# Patient Record
Sex: MALE | Race: ASIAN | NOT HISPANIC OR LATINO
[De-identification: names, ages, dates, MRNs, and addresses within clinical notes are randomized per-mention and may not be internally consistent; named-entity substitution may affect disease eponyms.]

---

## 2017-03-09 PROBLEM — Z00.00 ENCOUNTER FOR PREVENTIVE HEALTH EXAMINATION: Status: ACTIVE | Noted: 2017-03-09

## 2017-03-31 ENCOUNTER — APPOINTMENT (OUTPATIENT)
Dept: HUMAN REPRODUCTION | Facility: CLINIC | Age: 47
End: 2017-03-31

## 2017-06-13 ENCOUNTER — APPOINTMENT (OUTPATIENT)
Dept: HUMAN REPRODUCTION | Facility: CLINIC | Age: 47
End: 2017-06-13

## 2017-11-16 ENCOUNTER — APPOINTMENT (OUTPATIENT)
Dept: HUMAN REPRODUCTION | Facility: CLINIC | Age: 47
End: 2017-11-16
Payer: COMMERCIAL

## 2017-11-16 PROCEDURE — 89259 CRYOPRESERVATION SPERM: CPT

## 2017-11-16 PROCEDURE — 89261 SPERM ISOLATION COMPLEX: CPT

## 2017-11-16 PROCEDURE — 89322 SEMEN ANAL STRICT CRITERIA: CPT | Mod: 59

## 2017-11-16 PROCEDURE — 89343 STORAGE/YEAR SPERM/SEMEN: CPT

## 2017-11-16 PROCEDURE — 89325 SPERM ANTIBODY TEST: CPT

## 2017-12-05 PROCEDURE — 89261 SPERM ISOLATION COMPLEX: CPT

## 2017-12-05 PROCEDURE — 89322 SEMEN ANAL STRICT CRITERIA: CPT | Mod: 59

## 2021-08-23 ENCOUNTER — APPOINTMENT (OUTPATIENT)
Dept: HUMAN REPRODUCTION | Facility: CLINIC | Age: 51
End: 2021-08-23

## 2021-12-17 ENCOUNTER — APPOINTMENT (OUTPATIENT)
Dept: HUMAN REPRODUCTION | Facility: CLINIC | Age: 51
End: 2021-12-17

## 2022-02-11 ENCOUNTER — APPOINTMENT (OUTPATIENT)
Dept: HUMAN REPRODUCTION | Facility: CLINIC | Age: 52
End: 2022-02-11

## 2022-03-29 ENCOUNTER — APPOINTMENT (OUTPATIENT)
Dept: GASTROENTEROLOGY | Facility: CLINIC | Age: 52
End: 2022-03-29
Payer: COMMERCIAL

## 2022-03-29 VITALS
BODY MASS INDEX: 30.13 KG/M2 | HEIGHT: 67 IN | WEIGHT: 192 LBS | SYSTOLIC BLOOD PRESSURE: 115 MMHG | DIASTOLIC BLOOD PRESSURE: 80 MMHG

## 2022-03-29 DIAGNOSIS — I45.10 UNSPECIFIED RIGHT BUNDLE-BRANCH BLOCK: ICD-10-CM

## 2022-03-29 DIAGNOSIS — Z83.71 FAMILY HISTORY OF COLONIC POLYPS: ICD-10-CM

## 2022-03-29 DIAGNOSIS — Z86.69 PERSONAL HISTORY OF OTHER DISEASES OF THE NERVOUS SYSTEM AND SENSE ORGANS: ICD-10-CM

## 2022-03-29 DIAGNOSIS — Z83.71 ENCOUNTER FOR SCREENING FOR MALIGNANT NEOPLASM OF COLON: ICD-10-CM

## 2022-03-29 DIAGNOSIS — J98.4 OTHER DISORDERS OF LUNG: ICD-10-CM

## 2022-03-29 DIAGNOSIS — R73.03 PREDIABETES.: ICD-10-CM

## 2022-03-29 DIAGNOSIS — Z78.9 OTHER SPECIFIED HEALTH STATUS: ICD-10-CM

## 2022-03-29 DIAGNOSIS — Z12.11 ENCOUNTER FOR SCREENING FOR MALIGNANT NEOPLASM OF COLON: ICD-10-CM

## 2022-03-29 DIAGNOSIS — K76.89 OTHER SPECIFIED DISEASES OF LIVER: ICD-10-CM

## 2022-03-29 DIAGNOSIS — Z87.09 PERSONAL HISTORY OF OTHER DISEASES OF THE RESPIRATORY SYSTEM: ICD-10-CM

## 2022-03-29 PROCEDURE — 99204 OFFICE O/P NEW MOD 45 MIN: CPT

## 2022-03-29 RX ORDER — SODIUM SULFATE, POTASSIUM SULFATE, MAGNESIUM SULFATE 17.5; 3.13; 1.6 G/ML; G/ML; G/ML
17.5-3.13-1.6 SOLUTION, CONCENTRATE ORAL TWICE DAILY
Qty: 2 | Refills: 0 | Status: ACTIVE | COMMUNITY
Start: 2022-03-29 | End: 1900-01-01

## 2022-03-29 NOTE — HISTORY OF PRESENT ILLNESS
[FreeTextEntry1] : He is an asymptomatic 51-year-old male referred for a screening colonoscopy.  He has a family history of colon polyps specifically his father.  His last colonoscopy was 5 years ago.  He also recently was found to have a  right bundle branch block so had a  CT of his coronary arteries which revealed a questionable liver cyst.  He denies abdominal pain, weight loss or jaundice.

## 2022-03-29 NOTE — CONSULT LETTER
[Dear  ___] : Dear  [unfilled], [Consult Letter:] : I had the pleasure of evaluating your patient, [unfilled]. [( Thank you for referring [unfilled] for consultation for _____ )] : Thank you for referring [unfilled] for consultation for [unfilled] [Please see my note below.] : Please see my note below. [Consult Closing:] : Thank you very much for allowing me to participate in the care of this patient.  If you have any questions, please do not hesitate to contact me. [Sincerely,] : Sincerely, [FreeTextEntry3] : Nathaniel Kiran MD\par \par Gastroenterology\par [facility]\par [facility]\par \par

## 2022-03-29 NOTE — ASSESSMENT
[FreeTextEntry1] : FIDENCIO CLARK was advised to undergo colonoscopy to which he agreed. The procedure will be performed in [facility] \par [facility] with the assistance of an anesthesiologist. The patient was given a Suprep preparation prescription and understood the \par procedure as it was explained to his. He was given a booklet distributed by the American Society of Gastrointestinal\par  Endoscopy explaining the procedure in detail and he understood the risks of the procedure not limited to infection, bleeding,\par perforation or non- diagnosis of colorectal cancer. He was advised that he could not drive home, if he chooses to\par  receive sedation.\par \par Further diagnostic and treatment recommendations will be based upon the procedure and any biopsies, if they are taken.\par \par Thank you for allowing me to participate in this [facility] health care.\par \par , Best personal regards -- Don\par \par \par CT of abdomen with contrast ordered

## 2022-04-09 ENCOUNTER — APPOINTMENT (OUTPATIENT)
Dept: HUMAN REPRODUCTION | Facility: CLINIC | Age: 52
End: 2022-04-09

## 2022-05-10 ENCOUNTER — APPOINTMENT (OUTPATIENT)
Dept: GASTROENTEROLOGY | Facility: AMBULATORY SURGERY CENTER | Age: 52
End: 2022-05-10

## 2022-12-15 ENCOUNTER — APPOINTMENT (OUTPATIENT)
Dept: PULMONOLOGY | Facility: CLINIC | Age: 52
End: 2022-12-15

## 2022-12-15 ENCOUNTER — LABORATORY RESULT (OUTPATIENT)
Age: 52
End: 2022-12-15

## 2022-12-15 VITALS
BODY MASS INDEX: 30.61 KG/M2 | TEMPERATURE: 98 F | HEIGHT: 67 IN | SYSTOLIC BLOOD PRESSURE: 120 MMHG | OXYGEN SATURATION: 98 % | DIASTOLIC BLOOD PRESSURE: 74 MMHG | WEIGHT: 195 LBS | HEART RATE: 93 BPM

## 2022-12-15 DIAGNOSIS — J45.909 UNSPECIFIED ASTHMA, UNCOMPLICATED: ICD-10-CM

## 2022-12-15 PROCEDURE — 36415 COLL VENOUS BLD VENIPUNCTURE: CPT

## 2022-12-15 PROCEDURE — 99205 OFFICE O/P NEW HI 60 MIN: CPT | Mod: 25

## 2022-12-15 PROCEDURE — 94727 GAS DIL/WSHOT DETER LNG VOL: CPT

## 2022-12-15 PROCEDURE — 94060 EVALUATION OF WHEEZING: CPT

## 2022-12-15 PROCEDURE — 94729 DIFFUSING CAPACITY: CPT

## 2022-12-15 RX ORDER — FLUTICASONE FUROATE, UMECLIDINIUM BROMIDE AND VILANTEROL TRIFENATATE 100; 62.5; 25 UG/1; UG/1; UG/1
100-62.5-25 POWDER RESPIRATORY (INHALATION)
Refills: 0 | Status: ACTIVE | COMMUNITY

## 2022-12-15 RX ORDER — ALBUTEROL SULFATE 90 UG/1
108 (90 BASE) INHALANT RESPIRATORY (INHALATION)
Refills: 0 | Status: ACTIVE | COMMUNITY

## 2022-12-15 RX ORDER — BRIMONIDINE TARTRATE 1 MG/ML
SOLUTION/ DROPS OPHTHALMIC
Refills: 0 | Status: ACTIVE | COMMUNITY

## 2022-12-15 NOTE — DISCUSSION/SUMMARY
[FreeTextEntry1] : He is a 52-year-old man with a history of asthma.\par \par He stated that his asthma symptoms have been worse for the past 6 weeks.  His kids have been sick with the flu and strep.  Everybody in his family has been sick.  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  He was seen by ENT and given prednisone.  This helped him.  He had been on Advair but was placed on Trelegy recently which also helped him.  At present he denies any constitutional symptoms.  He has a dry cough.  He produces yellow phlegm, mostly in the morning hours.  He does not have any pets.  He never smoked.\par \par His cough appears to be postviral.  It appears that he had a viral infection in October.  He had COVID in May 2022.  He was advised to continue with the current treatment.  He should check with ophthalmology if it is okay to use Trelegy.  A chest radiograph will be obtained.  Blood work was obtained.  Further recommendations will follow.\par \par \par \par

## 2022-12-15 NOTE — REVIEW OF SYSTEMS
[Postnasal Drip] : postnasal drip [Cough] : cough [Chest Tightness] : chest tightness [Wheezing] : wheezing [Chest Discomfort] : chest discomfort [Hay Fever] : hay fever [Diabetes] : diabetes [Fever] : no fever [Chills] : no chills [Hemoptysis] : no hemoptysis [Sputum] : no sputum [Dyspnea] : no dyspnea [Edema] : no edema [Nasal Discharge] : no nasal discharge [GERD] : no gerd [Arthralgias] : no arthralgias [Rash] : no rash [Headache] : no headache [Anxiety] : no anxiety [Thyroid Problem] : no thyroid problem

## 2022-12-15 NOTE — HISTORY OF PRESENT ILLNESS
[Never] : never [TextBox_4] : He is a 52-year-old man with a history of asthma.\par \par He stated that his asthma symptoms have been worse for the past 6 weeks.  His kids have been sick with the flu and strep.  Everybody in his family has been sick.  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  He was seen by ENT and given prednisone.  This helped him.  He had been on Advair but was placed on Trelegy recently which also helped him.  At present he denies any constitutional symptoms.  He has a dry cough.  He produces yellow phlegm, mostly in the morning hours.  He does not have any pets.  He never smoked. [Difficulty Initiating Sleep] : does not have difficulty initiating sleep

## 2022-12-15 NOTE — PROCEDURE
[FreeTextEntry1] : PFT 12/15/22: Mild obstruction.  Lung volumes were normal.  Diffusion was normal.  Mild improvement noted after inhalation of bronchodilator.

## 2022-12-15 NOTE — PHYSICAL EXAM
[No Acute Distress] : no acute distress [Normal Oropharynx] : normal oropharynx [No Neck Mass] : no neck mass [Normal Rate/Rhythm] : normal rate/rhythm [Normal S1, S2] : normal s1, s2 [No Resp Distress] : no resp distress [Rhonchi] : rhonchi [No HSM] : no hsm [Normal Gait] : normal gait [No Cyanosis] : no cyanosis [No Edema] : no edema [Normal Turgor] : normal turgor [No Focal Deficits] : no focal deficits [Oriented x3] : oriented x3 [Normal Affect] : normal affect

## 2022-12-16 LAB
ALBUMIN SERPL ELPH-MCNC: 4 G/DL
ALP BLD-CCNC: 45 U/L
ALT SERPL-CCNC: 50 U/L
ANION GAP SERPL CALC-SCNC: 12 MMOL/L
AST SERPL-CCNC: 20 U/L
BASOPHILS # BLD AUTO: 0.06 K/UL
BASOPHILS NFR BLD AUTO: 0.6 %
BILIRUB SERPL-MCNC: 0.2 MG/DL
BUN SERPL-MCNC: 23 MG/DL
CALCIUM SERPL-MCNC: 9.4 MG/DL
CHLORIDE SERPL-SCNC: 102 MMOL/L
CO2 SERPL-SCNC: 24 MMOL/L
CREAT SERPL-MCNC: 0.93 MG/DL
EGFR: 98 ML/MIN/1.73M2
EOSINOPHIL # BLD AUTO: 0.09 K/UL
EOSINOPHIL # BLD MANUAL: 110 /UL
EOSINOPHIL NFR BLD AUTO: 0.9 %
GLUCOSE SERPL-MCNC: 177 MG/DL
HCT VFR BLD CALC: 49.4 %
HGB BLD-MCNC: 16.2 G/DL
IMM GRANULOCYTES NFR BLD AUTO: 4.5 %
LYMPHOCYTES # BLD AUTO: 2.97 K/UL
LYMPHOCYTES NFR BLD AUTO: 29 %
MAN DIFF?: NORMAL
MCHC RBC-ENTMCNC: 32 PG
MCHC RBC-ENTMCNC: 32.8 GM/DL
MCV RBC AUTO: 97.6 FL
MONOCYTES # BLD AUTO: 0.75 K/UL
MONOCYTES NFR BLD AUTO: 7.3 %
NEUTROPHILS # BLD AUTO: 5.91 K/UL
NEUTROPHILS NFR BLD AUTO: 57.7 %
PLATELET # BLD AUTO: 276 K/UL
POTASSIUM SERPL-SCNC: 4.5 MMOL/L
PROT SERPL-MCNC: 6.2 G/DL
RBC # BLD: 5.06 M/UL
RBC # FLD: 14 %
SODIUM SERPL-SCNC: 139 MMOL/L
WBC # FLD AUTO: 10.24 K/UL

## 2022-12-19 LAB — IGE SER-MCNC: 688 KU/L

## 2022-12-20 ENCOUNTER — NON-APPOINTMENT (OUTPATIENT)
Age: 52
End: 2022-12-20

## 2022-12-20 LAB
A ALTERNATA IGE QN: 1.87 KUA/L
A FUMIGATUS IGE QN: 0.7 KUA/L
C ALBICANS IGE QN: <0.1 KUA/L
C HERBARUM IGE QN: 0.17 KUA/L
CAT DANDER IGE QN: 23.6 KUA/L
COMMON RAGWEED IGE QN: 28.8 KUA/L
D FARINAE IGE QN: >100 KUA/L
D PTERONYSS IGE QN: 49.3 KUA/L
DEPRECATED A ALTERNATA IGE RAST QL: 2
DEPRECATED A FUMIGATUS IGE RAST QL: 2
DEPRECATED C ALBICANS IGE RAST QL: 0
DEPRECATED C HERBARUM IGE RAST QL: NORMAL
DEPRECATED CAT DANDER IGE RAST QL: 4
DEPRECATED COMMON RAGWEED IGE RAST QL: 4
DEPRECATED D FARINAE IGE RAST QL: 6
DEPRECATED D PTERONYSS IGE RAST QL: 4
DEPRECATED DOG DANDER IGE RAST QL: 3
DEPRECATED M RACEMOSUS IGE RAST QL: 0
DEPRECATED ROACH IGE RAST QL: NORMAL
DEPRECATED TIMOTHY IGE RAST QL: 4
DEPRECATED WHITE OAK IGE RAST QL: 3
DOG DANDER IGE QN: 3.53 KUA/L
M RACEMOSUS IGE QN: <0.1 KUA/L
ROACH IGE QN: 0.33 KUA/L
TIMOTHY IGE QN: 27.4 KUA/L
WHITE OAK IGE QN: 11.2 KUA/L

## 2023-01-13 ENCOUNTER — APPOINTMENT (OUTPATIENT)
Dept: PULMONOLOGY | Facility: CLINIC | Age: 53
End: 2023-01-13

## 2023-01-24 ENCOUNTER — NON-APPOINTMENT (OUTPATIENT)
Age: 53
End: 2023-01-24

## 2024-06-05 ENCOUNTER — APPOINTMENT (OUTPATIENT)
Dept: GASTROENTEROLOGY | Facility: CLINIC | Age: 54
End: 2024-06-05
Payer: COMMERCIAL

## 2024-06-05 VITALS
DIASTOLIC BLOOD PRESSURE: 74 MMHG | SYSTOLIC BLOOD PRESSURE: 120 MMHG | HEIGHT: 67 IN | RESPIRATION RATE: 14 BRPM | BODY MASS INDEX: 29.82 KG/M2 | OXYGEN SATURATION: 99 % | TEMPERATURE: 97 F | HEART RATE: 90 BPM | WEIGHT: 190 LBS

## 2024-06-05 DIAGNOSIS — Z83.719 ENCOUNTER FOR SCREENING FOR MALIGNANT NEOPLASM OF COLON: ICD-10-CM

## 2024-06-05 DIAGNOSIS — Z12.11 ENCOUNTER FOR SCREENING FOR MALIGNANT NEOPLASM OF COLON: ICD-10-CM

## 2024-06-05 PROCEDURE — 99213 OFFICE O/P EST LOW 20 MIN: CPT

## 2024-06-08 PROBLEM — Z12.11 COLON CANCER SCREENING: Status: ACTIVE | Noted: 2022-03-29

## 2024-06-08 PROBLEM — Z12.11 ENCOUNTER FOR COLONOSCOPY IN PATIENT WITH FAMILY HISTORY OF COLON POLYPS: Status: ACTIVE | Noted: 2024-06-08 | Resolved: 2024-06-22

## 2024-06-08 RX ORDER — SODIUM SULFATE, POTASSIUM SULFATE AND MAGNESIUM SULFATE 1.6; 3.13; 17.5 G/177ML; G/177ML; G/177ML
17.5-3.13-1.6 SOLUTION ORAL TWICE DAILY
Qty: 2 | Refills: 0 | Status: ACTIVE | COMMUNITY
Start: 2024-06-08 | End: 1900-01-01

## 2024-06-08 NOTE — HISTORY OF PRESENT ILLNESS
[FreeTextEntry1] : He is a 53 year old asymptomatic male referred for a screening colonoscopy. He has never had a colonoscopy before. He has a family history of colon polyps, but not of colon cancer, specifically his father. He was here 2 years ago for the same issue but did not follow through to have his colonoscopy

## 2024-06-08 NOTE — CONSULT LETTER
[Dear  ___] : Dear  [unfilled], [Consult Letter:] : I had the pleasure of evaluating your patient, [unfilled]. [( Thank you for referring [unfilled] for consultation for _____ )] : Thank you for referring [unfilled] for consultation for [unfilled] [Please see my note below.] : Please see my note below. [Consult Closing:] : Thank you very much for allowing me to participate in the care of this patient.  If you have any questions, please do not hesitate to contact me. [Sincerely,] : Sincerely, [FreeTextEntry3] : Nathaniel Kiran MD  Gastroenterology [facility]

## 2024-06-08 NOTE — ASSESSMENT
[FreeTextEntry1] : FIDENCIO CLARK was advised to undergo colonoscopy to which he agreed. The procedure will be performed in [facility]  [facility] with the assistance of an anesthesiologist. The patient was given a Suprep preparation prescription and understood the  procedure as it was explained to his. He was given a booklet distributed by the [employer] Society of Gastrointestinal  Endoscopy explaining the procedure in detail and he understood the risks of the procedure not limited to infection, bleeding, perforation or non- diagnosis of colorectal cancer. He was advised that he could not drive home, if he chooses to  receive sedation.  Further diagnostic and treatment recommendations will be based upon the procedure and any biopsies, if they are taken.  Thank you for allowing me to participate in this [facility] care.  , Best personal regards -- Don   I spent 22 minutes with the patient and answered all of his questions

## 2024-09-10 ENCOUNTER — APPOINTMENT (OUTPATIENT)
Dept: GASTROENTEROLOGY | Facility: AMBULATORY SURGERY CENTER | Age: 54
End: 2024-09-10
Payer: COMMERCIAL

## 2024-09-10 DIAGNOSIS — Z12.11 ENCOUNTER FOR SCREENING FOR MALIGNANT NEOPLASM OF COLON: ICD-10-CM

## 2024-09-10 DIAGNOSIS — Z83.719 ENCOUNTER FOR SCREENING FOR MALIGNANT NEOPLASM OF COLON: ICD-10-CM

## 2024-09-10 DIAGNOSIS — K64.8 OTHER HEMORRHOIDS: ICD-10-CM

## 2024-09-10 PROCEDURE — 45378 DIAGNOSTIC COLONOSCOPY: CPT

## 2024-09-10 RX ORDER — HYDROCORTISONE 2.5% 25 MG/G
2.5 CREAM TOPICAL TWICE DAILY
Qty: 1 | Refills: 3 | Status: ACTIVE | COMMUNITY
Start: 2024-09-10 | End: 1900-01-01

## 2024-10-09 ENCOUNTER — APPOINTMENT (OUTPATIENT)
Dept: GASTROENTEROLOGY | Facility: CLINIC | Age: 54
End: 2024-10-09